# Patient Record
(demographics unavailable — no encounter records)

---

## 2024-10-14 NOTE — ASSESSMENT
[FreeTextEntry1] :  Reviewed and reconciled medications, allergies, PMHx, PSHx, SocHx, FMHx.  Patient with h/o cerumen impactions, chronic bilateral OE, and chronic rhinitis presents today with Mom for a follow up. She denies feeling a clogged sensation in her ears. Mom states that she feels like she has not been hearing as well lately.   Physical exam: -right ear canal: cerumen removed via curettage and suction -left ear canal: cerumen impaction removed via curettage and suction -no lateralization to tuning forks -inflamed turbinates bilaterally -class 1 tonsils   Plan: -Continue Fluocinolone ear drops - one drop in the ear every night (alternating ears each night) -FU in 3 months

## 2024-10-14 NOTE — ADDENDUM
[FreeTextEntry1] :  Documented by Melecio Woodward acting as scribe for Dr. Gerber on 10/14/2024. All Medical record entries made by the Scribe were at my, Dr. Gerber, direction and personally dictated by me on 10/14/2024 . I have reviewed the chart and agree that the record accurately reflects my personal performance of the history, physical exam, assessment and plan. I have also personally directed, reviewed, and agreed with the discharge instructions.

## 2024-10-14 NOTE — PHYSICAL EXAM
[1+] : 1+ [Normal] : normal [FreeTextEntry8] :  cerumen removed via curettage and suction [FreeTextEntry9] :  cerumen impaction removed via curettage and suction [de-identified] : inflamed turbinates [de-identified] : inflamed turbinates

## 2024-10-14 NOTE — CONSULT LETTER
[Dear  ___] : Dear  [unfilled], [Courtesy Letter:] : I had the pleasure of seeing your patient, [unfilled], in my office today. [Please see my note below.] : Please see my note below. [Consult Closing:] : Thank you very much for allowing me to participate in the care of this patient.  If you have any questions, please do not hesitate to contact me. [Sincerely,] : Sincerely, [FreeTextEntry3] :  Richard Gerber MD FACS

## 2024-10-14 NOTE — HISTORY OF PRESENT ILLNESS
[de-identified] : Patient with h/o cerumen impactions, chronic bilateral OE, and chronic rhinitis presents today with Mom for a follow up. She denies feeling a clogged sensation in her ears. Mom states that she feels like she has not been hearing as well lately.

## 2025-04-16 NOTE — HISTORY OF PRESENT ILLNESS
[de-identified] : Patient with h/o bilateral cerumen impactions, chronic bilateral OE, bilateral SNHL, and chronic rhinitis presents today with Mom stating that she has been having recurrent sinus infections. Mom states that she gets nasal congestion and a runny nose with sinus infections. She states that she gets yellow nasal discharge with some blood. Mom denies that she is snoring at night. Mom states that she breathes through her mouth at night. Mom states that she has a sinus infection in February which improved with antibiotics, but then her symptoms came back. Mom states that she has been using Fluocinolone oil in her ears.

## 2025-04-16 NOTE — HISTORY OF PRESENT ILLNESS
[de-identified] : Patient with h/o bilateral cerumen impactions, chronic bilateral OE, bilateral SNHL, and chronic rhinitis presents today with Mom stating that she has been having recurrent sinus infections. Mom states that she gets nasal congestion and a runny nose with sinus infections. She states that she gets yellow nasal discharge with some blood. Mom denies that she is snoring at night. Mom states that she breathes through her mouth at night. Mom states that she has a sinus infection in February which improved with antibiotics, but then her symptoms came back. Mom states that she has been using Fluocinolone oil in her ears.

## 2025-04-16 NOTE — PHYSICAL EXAM
[Normal] : normal [2+] : 2+ [de-identified] : sinuses nontender to percussion. sensations intact [FreeTextEntry8] : cerumen impaction removed via curettage. Peroxide added and the remaining cerumen removed via suction [FreeTextEntry9] : cerumen impaction removed via curettage. Peroxide added and the remaining cerumen removed via suction [de-identified] : minimally deviated septum right. mildly inflamed turbinates [de-identified] : mildly inflamed turbinates [de-identified] : type 3 oral cavity

## 2025-04-16 NOTE — PHYSICAL EXAM
[Normal] : normal [2+] : 2+ [de-identified] : sinuses nontender to percussion. sensations intact [FreeTextEntry8] : cerumen impaction removed via curettage. Peroxide added and the remaining cerumen removed via suction [FreeTextEntry9] : cerumen impaction removed via curettage. Peroxide added and the remaining cerumen removed via suction [de-identified] : minimally deviated septum right. mildly inflamed turbinates [de-identified] : mildly inflamed turbinates [de-identified] : type 3 oral cavity

## 2025-04-16 NOTE — ADDENDUM
[FreeTextEntry1] :  Documented by Melecio Woodward acting as scribe for Dr. Gerber on 04/16/2025. All Medical record entries made by the Scribe were at my, Dr. Gerber, direction and personally dictated by me on 04/16/2025 . I have reviewed the chart and agree that the record accurately reflects my personal performance of the history, physical exam, assessment and plan. I have also personally directed, reviewed, and agreed with the discharge instructions.

## 2025-04-16 NOTE — PROCEDURE
[FreeTextEntry1] : Flexible nasal endoscopy [FreeTextEntry2] : Chronic rhinitis [FreeTextEntry3] :  Procedure: Flexible Nasal Endoscopy: Risks, benefits, and alternatives of flexible endoscopy were explained to the patient. The patient gave oral consent to proceed. The flexible scope was inserted into the right nasal cavity. Endoscopy of the inferior and middle meatus was performed. No polyp, mass, or lesion was appreciated. Olfactory cleft was clear. Spheno-ethmoid recess is clear. Nasopharynx was with minimal adenoid tissue. Turbinates were without mass, but a large left middle turbinate. There was a deviated septum right up against the middle turbinate.  There was mucoid discharge intranasally on the right. The procedure was repeated on the contralateral side with similar findings. -deviated septum right up against the middle turbinate -large left middle turbinate -mucoid discharge intranasally on the right -minimal adenoid tissue

## 2025-04-16 NOTE — ASSESSMENT
[FreeTextEntry1] : Reviewed and reconciled medications, allergies, PMHx, PSHx, SocHx, FMHx.  Patient with h/o bilateral cerumen impactions, chronic bilateral OE, bilateral SNHL, and chronic rhinitis presents today with Mom stating that she has been having recurrent sinus infections. Mom states that she gets nasal congestion and a runny nose with sinus infections. She states that she gets yellow nasal discharge with some blood. Mom denies that she is snoring at night. Mom states that she breathes through her mouth at night. Mom states that she has a sinus infection in February which improved with antibiotics, but then her symptoms came back. Mom states that she has been using Fluocinolone oil in her ears.  Physical exam: -right ear canal: cerumen impaction removed via curettage. Peroxide added and the remaining cerumen removed via suction -left ear canal: cerumen impaction removed via curettage. Peroxide added and the remaining cerumen removed via suction -minimally deviated septum right -mildly inflamed turbinates bilaterally -class 2 tonsils -type 3 oral cavity  ENT Procedure: Flexible nasal endoscopy -deviated septum right up against the middle turbinate -large left middle turbinate -mucoid discharge intranasally on the right -minimal adenoid tissue  Plan: -Continue Fluocinolone ear drops - 1 drop in the ear each night (alternating ears each night) -Ordered CT Sinuses for potential surgical planning -FU with results from CT scan

## 2025-05-23 NOTE — HISTORY OF PRESENT ILLNESS
[de-identified] : Patient with h/o bilateral cerumen impactions, chronic bilateral OE, bilateral SNHL, recurrent sinusitis, and chronic rhinitis presents today with Dad for a follow up and to discuss results from CT scan. She denies feeling congested. She denies headaches. She denies having asthma. 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

## 2025-05-23 NOTE — PHYSICAL EXAM
[2+] : 2+ [Normal] : normal [FreeTextEntry8] : cerumen impaction removed via curettage and suction [FreeTextEntry9] : cerumen impaction removed via suction [de-identified] : mildly deviated septum right without obstruction. inflamed turbinates [de-identified] : inflamed turbinates

## 2025-05-23 NOTE — DATA REVIEWED
[FreeTextEntry1] : CT Sinuses 4/23/25: -mucosal thickening in the maxillary sinuses bilaterally with obstructed drain pathways -thickening in the left frontal sinus with obstructed drain pathways -right frontal sinus is small and clear -thickening in the ethmoid sinuses bilaterally -mild thickening in the sphenoid sinuses bilaterally with obstructed drain pathways bilaterally -deviated septum right

## 2025-05-23 NOTE — HISTORY OF PRESENT ILLNESS
[de-identified] : Patient with h/o bilateral cerumen impactions, chronic bilateral OE, bilateral SNHL, recurrent sinusitis, and chronic rhinitis presents today with Dad for a follow up and to discuss results from CT scan. She denies feeling congested. She denies headaches. She denies having asthma. 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

## 2025-05-23 NOTE — ASSESSMENT
[FreeTextEntry1] : Reviewed and reconciled medications, allergies, PMHx, PSHx, SocHx, FMHx.    CT Sinuses 4/23/25: -mucosal thickening in the maxillary sinuses bilaterally with obstructed drain pathways -thickening in the left frontal sinus with obstructed drain pathways -right frontal sinus is small and clear -thickening in the ethmoid sinuses bilaterally -mild thickening in the sphenoid sinuses bilaterally with obstructed drain pathways bilaterally -deviated septum right  Physical exam: -right ear canal: cerumen impaction removed via curettage and suction -left ear canal: cerumen impaction removed via suction -mildly deviated septum right without obstruction -inflamed turbinates bilaterally -class 2 tonsils  Plan: CT scan reviewed and discussed with the patient and patient's Dad. -Start Flonase - 1 spray bilaterally QD, spray laterally -Start Astelin - 1 spray bilaterally BID, spray laterally -Consider ear cleaning system every 3-4 weeks -Consider allergy testing pending response to nasal spray -FU in 3 months

## 2025-05-23 NOTE — PHYSICAL EXAM
[2+] : 2+ [Normal] : normal [FreeTextEntry8] : cerumen impaction removed via curettage and suction [FreeTextEntry9] : cerumen impaction removed via suction [de-identified] : mildly deviated septum right without obstruction. inflamed turbinates [de-identified] : inflamed turbinates

## 2025-05-23 NOTE — ADDENDUM
[FreeTextEntry1] :  Documented by Melecio Woodward acting as scribe for Dr. Gerber on 05/23/2025. All Medical record entries made by the Scribe were at my, Dr. Gerber, direction and personally dictated by me on 05/23/2025 . I have reviewed the chart and agree that the record accurately reflects my personal performance of the history, physical exam, assessment and plan. I have also personally directed, reviewed, and agreed with the discharge instructions.